# Patient Record
Sex: FEMALE | Race: WHITE
[De-identification: names, ages, dates, MRNs, and addresses within clinical notes are randomized per-mention and may not be internally consistent; named-entity substitution may affect disease eponyms.]

---

## 2018-12-18 ENCOUNTER — HOSPITAL ENCOUNTER (EMERGENCY)
Dept: HOSPITAL 59 - ER | Age: 23
Discharge: HOME | End: 2018-12-18
Payer: MEDICAID

## 2018-12-18 DIAGNOSIS — K08.89: Primary | ICD-10-CM

## 2018-12-18 PROCEDURE — 99282 EMERGENCY DEPT VISIT SF MDM: CPT

## 2018-12-18 NOTE — EMERGENCY DEPARTMENT RECORD
History of Present Illness





- General


Chief complaint: Toothache


Stated complaint: DENTAL PAIN


Time Seen by Provider: 12/18/18 18:03


Source: Patient


Mode of Arrival: Ambulatory


Limitations: No limitations





- History of Present Illness


Initial comments: 





24 yo female presents to ED for evaluation of right lower molar pain and 

swelling for the past several days.  Patient saw her dentist yesterday, was 

told that she would need her wisdom teeth removed, has an appointment in 2 

days.  Patient reports increased swelling and pain to the area, reports taking 

Tylenol at home for her symptoms.  Patient denies fevers, chills, or recent 

illness.


MD complaint: Tooth pain


Onset/Timing: 3


-: Days(s)


Location: Tooth #





  __________________________














  __________________________





 1 - Pain, swelling





Severity: Moderate


Quality: Aching


Consistency: Constant


Improves with: None


Worsens with: Swallowing





- Related Data


 Home Medications











 Medication  Instructions  Recorded  Confirmed  Last Taken


 


No Home Med [NO HOME MEDS]  12/18/18 12/18/18 Unknown








 Previous Rx's











 Medication  Instructions  Recorded


 


Clindamycin HCl [Cleocin HCl] 300 mg PO QID #28 capsule 12/18/18


 


Naproxen [Naprosyn] 500 mg PO Q12H #30 tab 12/18/18











 Allergies











Allergy/AdvReac Type Severity Reaction Status Date / Time


 


amoxicillin Allergy Intermediate stomach Verified 12/18/18 18:05





   issues  


 


Penicillins Allergy Intermediate stomach Verified 12/18/18 18:05





   issues  














Review of Systems


Constitutional: Denies: Chills, Fever, Malaise, Night sweats


Eyes: Denies: Eye discharge, Eye pain


ENT: Reports: Dental pain.  Denies: Congestion, Epistaxis


Respiratory: Denies: Cough, Dyspnea, Hemoptysis


Cardiovascular: Denies: Chest pain, Dyspnea on exertion


Endocrine: Denies: Fatigue, Heat or cold intolerance


Gastrointestinal: Denies: Abdominal pain, Nausea, Vomiting


Genitourinary: Denies: Incontinence, Retention


Musculoskeletal: Denies: Arthralgia, Back pain


Skin: Denies: Bruising, Change in color


Neurological: Denies: Abnormal gait, Confusion, Headache


Psychiatric: Denies: Anxiety


Hematological/Lymphatic: Denies: Anemia, Blood Clots





Physical Exam





- General


General Appearance: Alert, Oriented x3, Cooperative, Mild distress


Limitations: No limitations





- Head


Head exam: Atraumatic, Normocephalic, Normal inspection


Head exam detail: negative: Abrasion, Contusion, Frank's sign, General 

tenderness, Hematoma, Laceration





- Eye


Eye exam: Normal appearance.  negative: Conjunctival injection, Periorbital 

swelling, Periorbital tenderness, Scleral icterus





- ENT


Ear exam: negative: Auricular hematoma, Auricular trauma


Nasal Exam: negative: Active bleeding, Discharge, Dried blood, Foreign body, 

Sinus tenderness


Mouth exam: negative: Drooling, Laceration, Muffled voice, Tongue elevation


Teeth exam: Dental tenderness #, Gingival enlargement, Other (No gingical 

abscess is present on examination)


Throat exam: negative: Tonsillar erythema, Tonsillomegaly, R peritonsillar mass

, L peritonsillar mass


Image of Mouth/Teeth: 


  __________________________














  __________________________





 1 - Mild swelling of the gingival tissue without abscess present








- Neck


Neck exam: Normal inspection.  negative: Meningismus, Tenderness





- Respiratory


Respiratory exam: Normal lung sounds bilaterally.  negative: Respiratory 

distress, Rhonchi, Stridor, Wheezes





- Cardiovascular


Cardiovascular Exam: Regular rate, Normal rhythm, Normal heart sounds





- GI/Abdominal


GI/Abdominal exam: Soft.  negative: Rebound, Rigid, Tenderness





- Rectal


Rectal exam: Deferred





- 


 exam: Deferred





- Extremities


Extremities exam: Normal inspection.  negative: Tenderness





- Back


Back exam: Denies: CVA tenderness (R), CVA tenderness (L)





- Neurological


Neurological exam: Alert, Normal gait, Oriented X3





- Psychiatric


Psychiatric exam: Normal affect, Normal mood





- Skin


Skin exam: Normal color.  negative: Abrasion


Type of lesion: negative: abrasion





Course





- Reevaluation(s)


Reevaluation #1: 





12/18/18 18:10


Patient was seen and examined


Will treat for possible early dental infection with Clindamycin and Naprosyn as 

directed


Patient has dental follow-up scheduled in 2 days as well


Patient is otherwise well appearing and stable for discharge at this time.





Disposition


Disposition: Discharge


Clinical Impression: 


 Pain, dental





Disposition: Home, Self-Care


Condition: (2) Stable


Instructions:  Toothache (ED)


Additional Instructions: 


Return to ED if your symptoms worsen or if you have any concerns.


Naprosyn and Clindamycin as directed.


Follow-up with your dentist as scheduled Thursday.


Prescriptions: 


Clindamycin HCl [Cleocin HCl] 300 mg PO QID #28 capsule


Naproxen [Naprosyn] 500 mg PO Q12H #30 tab.dr


Forms:  Patient Portal Access


Time of Disposition: 18:05





Quality





- Quality Measures


Quality Measures: N/A





- Blood Pressure Screening


Does Patient Have Any of the Following: No


Blood Pressure Classification: Pre-Hypertensive BP Reading


Systolic Measurement: 124


Diastolic Measurement: 78


Screening for High Blood Pressure: < Pre-Hypertensive BP, F/U Documented > [

]


Pre-Hypertensive Follow-up Interventions: Referral to alternative/primary care 

provider.